# Patient Record
(demographics unavailable — no encounter records)

---

## 2024-10-11 NOTE — PHYSICAL EXAM
[General Appearance - Well Developed] : well developed [Normal Appearance] : normal appearance [General Appearance - In No Acute Distress] : no acute distress [] : no respiratory distress [Exaggerated Use Of Accessory Muscles For Inspiration] : no accessory muscle use [Urinary Bladder Findings] : the bladder was normal on palpation [Normal Station and Gait] : the gait and station were normal for the patient's age [No Focal Deficits] : no focal deficits [Oriented To Time, Place, And Person] : oriented to person, place, and time [Chaperone Present] : A chaperone was present in the examining room during all aspects of the physical examination [de-identified] : L superficial scrotal skin wound/breakdown - granulation tissue present. No erythema.  No expressible purulence. No obvious abscess, however L epididymis and testis firm. Mild TTP.  R testis wnl [FreeTextEntry2] : Aleja Waller

## 2024-10-11 NOTE — ASSESSMENT
[FreeTextEntry1] : Patient is a 38 yo M who presents for L epididymoorchitis.  Exam and sono today in office appear consistent with L testicular/epididymal necrosis.  D/w pt that suspect his skin wound is related to underlying epididymal/testicular necrosis D/w pt that at this time I recommend L orchiectomy as testis does not appear salvageable.  Pt declines. D/w pt that observation may result in worsening infection/sepsis, however ucx previously negative after his abx.  D/w pt that there is still normal R testis and likely to still have good preservation of fertility. Will obtain further labs to assess for any other etiologies for his necrosis - r/o TB, STI He remains hesitant to undergo orchiectomy.  He wishes to have additional abx, I d/w pt that oral abx given prior courses unlikely to be effective, he would likely need prolonged course of IV abx - he is planning to go to hospital in [place] F/u 1 wk otherwise

## 2024-10-11 NOTE — HISTORY OF PRESENT ILLNESS
[FreeTextEntry1] : Patient is a 38 yo M who presents for L epididymoorchitis.  Prior hx: First seen in 11/2022 - He reports past couple months dealing with intermittent penile rash and itch. No lesions. He notes that during these episodes when he has sex his penis will be painful. He did see PCP and try 2 topical ointments. Initial ointment improved after applying once daily for 2 wks, but then subsequently recurred and was given abx ointment. No discharge, no dysuria or urinary symptoms. He does have remote STI 10 years ago, cannot recall what exactly. He denies any recent STI. Recent labs from 8/2022 reviewed - no STIs. He is monogamous with his girlfriend, but unprotected.  Interval hx: 9/16/24 Patient reports left scrotum pain, swelling, radiated to low pelvis and left thigh. not associated with urinary frequency, urgency, dysuria, hematuria, fever/chills. treated with doxycycline 100 mg daily x 5 days. He was levaquin 500 mg x 10 days, symptom persisted, then Bactrim x 10 days. He reports no recurrent balanitis since last visit in 2023. He states he is not sexually active recently. He feels firm left scrotum, mild tenderness -- symptoms are significantly improved, also reports negative STI/urine testing with PCP. Reports scrotal sono showed just infection. Denies history of frequent UTI. History of syphilis? 10 years ago.  10/11/24 Scrotal ULT 8/16/24 showed L epididymitis Scrotal sono today shows worsening L epididymoorchitis - sonographic appearance concerning for epididymal/testicular necrosis. There is hyperemic flow and edematous/heterogenous.  No infarct as diffuse flow noted.  Labs 9/16/24 - UA/GC/cx negative  Here for f/u.  Reports persistent mild L scrotal pain.  No fever/chills. However he reports 1 wk ago noticing skin breakdown/ulceration.

## 2024-10-17 NOTE — ASSESSMENT
[FreeTextEntry1] : Patient is a 38 yo M who presents for persistent/evolving L epididymoorchitis.  He had previously failed ~3 wks of oral abx, with worsening and skin involvement. D/w pt that there is concern as to the etiology of this - but given +TB quant suspect ? TB  D/w pt that surgical excision is an option, but there may be a possibility of salvaging his testis with anti-TB medicaton Will refer to ID for further mgmt/eval Will obtain CXR - although pt denies pulm sxs OR order placed - however d/w pt that would prefer ID consult prior to any surgical intervention.  D/w pt that may cancel/postpone OR pending ID

## 2024-10-17 NOTE — PHYSICAL EXAM
[General Appearance - Well Developed] : well developed [Normal Appearance] : normal appearance [General Appearance - In No Acute Distress] : no acute distress [] : no respiratory distress [Exaggerated Use Of Accessory Muscles For Inspiration] : no accessory muscle use [Urinary Bladder Findings] : the bladder was normal on palpation [Normal Station and Gait] : the gait and station were normal for the patient's age [No Focal Deficits] : no focal deficits [Oriented To Time, Place, And Person] : oriented to person, place, and time [de-identified] : L superficial scrotal skin wound/breakdown - granulation tissue present. No erythema.  No expressible purulence, there is mild erythema and sponginess surrounding the ulceration. L epididymis and testis firm. Mild TTP.  R testis wnl

## 2024-10-17 NOTE — HISTORY OF PRESENT ILLNESS
[FreeTextEntry1] : Patient is a 38 yo M who presents for L epididymoorchitis.  Prior hx: First seen in 11/2022 - He reports past couple months dealing with intermittent penile rash and itch. No lesions. He notes that during these episodes when he has sex his penis will be painful. He did see PCP and try 2 topical ointments. Initial ointment improved after applying once daily for 2 wks, but then subsequently recurred and was given abx ointment. No discharge, no dysuria or urinary symptoms. He does have remote STI 10 years ago, cannot recall what exactly. He denies any recent STI. Recent labs from 8/2022 reviewed - no STIs. He is monogamous with his girlfriend, but unprotected.  Interval hx: 9/16/24 Patient reports left scrotum pain, swelling, radiated to low pelvis and left thigh. not associated with urinary frequency, urgency, dysuria, hematuria, fever/chills. treated with doxycycline 100 mg daily x 5 days. He was levaquin 500 mg x 10 days, symptom persisted, then Bactrim x 10 days. He reports no recurrent balanitis since last visit in 2023. He states he is not sexually active recently. He feels firm left scrotum, mild tenderness -- symptoms are significantly improved, also reports negative STI/urine testing with PCP. Reports scrotal sono showed just infection. Denies history of frequent UTI. History of syphilis? 10 years ago.  10/11/24 Scrotal ULT 8/16/24 showed L epididymitis Scrotal sono in office 10/11/24 shows worsening L epididymoorchitis - sonographic appearance concerning for epididymal/testicular necrosis. There is hyperemic flow and edematous/heterogenous.  No infarct as diffuse flow noted. Started to notice ulceration/skin breakdown 1 week ago.  10/17/24 Here for f/u.  Reports persistent mild L scrotal pain - stable.  No fever/chills.  However he reports since noticingskin breakdown/ulceration he has had some minimal drainage.  Labs 9/16/24 - UA/GC/cx negative Labs 10/11/24 - repeat UA/cx neg. STI panel neg. TB Quant Gold positive. Tumor markers neg.

## 2024-10-18 NOTE — PHYSICAL EXAM
[General Appearance - Well Developed] : well developed [Normal Appearance] : normal appearance [General Appearance - In No Acute Distress] : no acute distress [] : no respiratory distress [Exaggerated Use Of Accessory Muscles For Inspiration] : no accessory muscle use [Abdomen Soft] : soft [Normal Station and Gait] : the gait and station were normal for the patient's age [No Focal Deficits] : no focal deficits [Oriented To Time, Place, And Person] : oriented to person, place, and time

## 2024-10-21 NOTE — HISTORY OF PRESENT ILLNESS
[FreeTextEntry1] : Patient is a 36 yo M who presents for L epididymoorchitis.  Prior hx: First seen in 11/2022 - He reports past couple months dealing with intermittent penile rash and itch. No lesions. He notes that during these episodes when he has sex his penis will be painful. He did see PCP and try 2 topical ointments. Initial ointment improved after applying once daily for 2 wks, but then subsequently recurred and was given abx ointment. No discharge, no dysuria or urinary symptoms. He does have remote STI 10 years ago, cannot recall what exactly. He denies any recent STI. Recent labs from 8/2022 reviewed - no STIs. He is monogamous with his girlfriend, but unprotected.  Interval hx: 9/16/24 Patient presents for f/u, not seen since 1/2023.  He reports left scrotum pain/swelling for ~month, radiation to low pelvis and left thigh. Not associated with urinary frequency, urgency, dysuria, hematuria, fever/chills. Initially treated with doxycycline 100 mg daily x 5 days but no change. He was then given levaquin 500 mg x 10 days, symptom persisted, then Bactrim x 10 days. He reports no recurrent balanitis since last visit in 2023. He states he is not sexually active recently. He feels firm left scrotum, mild tenderness -- pain/symptoms are significantly improved, also reports negative STI/urine testing with PCP (labs 8/15/24 reviewed). Reports scrotal sono showed just infection. Denies history of frequent UTI. History of syphilis? 10 years ago.  10/11/24 Scrotal ULT from PCP obtained 8/16/24 showed L epididymitis Scrotal sono in office 10/11/24 shows worsening L epididymoorchitis - sonographic appearance concerning for epididymal/testicular necrosis. There is hyperemic flow and edematous/heterogenous. No infarct as diffuse flow noted. Started to notice ulceration/skin breakdown 1 week ago.  10/17/24 Here for f/u. Reports persistent mild L scrotal pain - stable. No fever/chills. However he reports since noticing skin breakdown/ulceration he has had some minimal drainage.  Labs 9/16/24 - UA/GC/cx negative Labs 10/11/24 - repeat UA/cx neg. STI panel neg. TB Quant Gold positive. Tumor markers neg.  10/18/24 Scrotal sonogram yesterday noted, consistent with left epididymal orchitis. PST and chest xray has been done this morning. He has contacted ID and awaiting for appt. CXR 10/18/24 reviewed personally - appears clear without obvious pathology.

## 2024-10-21 NOTE — HISTORY OF PRESENT ILLNESS
[FreeTextEntry1] : Patient is a 38 yo M who presents for L epididymoorchitis.  Prior hx: First seen in 11/2022 - He reports past couple months dealing with intermittent penile rash and itch. No lesions. He notes that during these episodes when he has sex his penis will be painful. He did see PCP and try 2 topical ointments. Initial ointment improved after applying once daily for 2 wks, but then subsequently recurred and was given abx ointment. No discharge, no dysuria or urinary symptoms. He does have remote STI 10 years ago, cannot recall what exactly. He denies any recent STI. Recent labs from 8/2022 reviewed - no STIs. He is monogamous with his girlfriend, but unprotected.  Interval hx: 9/16/24 Patient presents for f/u, not seen since 1/2023.  He reports left scrotum pain/swelling for ~month, radiation to low pelvis and left thigh. Not associated with urinary frequency, urgency, dysuria, hematuria, fever/chills. Initially treated with doxycycline 100 mg daily x 5 days but no change. He was then given levaquin 500 mg x 10 days, symptom persisted, then Bactrim x 10 days. He reports no recurrent balanitis since last visit in 2023. He states he is not sexually active recently. He feels firm left scrotum, mild tenderness -- pain/symptoms are significantly improved, also reports negative STI/urine testing with PCP (labs 8/15/24 reviewed). Reports scrotal sono showed just infection. Denies history of frequent UTI. History of syphilis? 10 years ago.  10/11/24 Scrotal ULT from PCP obtained 8/16/24 showed L epididymitis Scrotal sono in office 10/11/24 shows worsening L epididymoorchitis - sonographic appearance concerning for epididymal/testicular necrosis. There is hyperemic flow and edematous/heterogenous. No infarct as diffuse flow noted. Started to notice ulceration/skin breakdown 1 week ago.  10/17/24 Here for f/u. Reports persistent mild L scrotal pain - stable. No fever/chills. However he reports since noticing skin breakdown/ulceration he has had some minimal drainage.  Labs 9/16/24 - UA/GC/cx negative Labs 10/11/24 - repeat UA/cx neg. STI panel neg. TB Quant Gold positive. Tumor markers neg.  10/18/24 Scrotal sonogram yesterday noted, consistent with left epididymal orchitis. PST and chest xray has been done this morning. He has contacted ID and awaiting for appt. CXR 10/18/24 reviewed personally - appears clear without obvious pathology.

## 2024-10-21 NOTE — ASSESSMENT
[FreeTextEntry1] : Patient is a 38 yo M who presents for persistent/evolving L epididymoorchitis. Suspect  TB  He had previously failed ~3 wks of oral abx, with worsening and skin involvement. D/w pt that there is concern as to the etiology of this, as prior ucx and STI all neg.  Given +TB quant suspect ? TB as presentation is atypical  10/18/24 CXR without obvious pulm involvement 10/17/24 Scrotal sono reviewed - hypervascular and heterogeneous L testis and epididymis. D/w pt that surgical excision is an option, but there may be a possibility of salvaging his testis with anti-TB medication Message sent to ID for further mgmt/eval Will send urine AFB - d/w pt that may take many weeks D/w pt that I have reviewed his case with several senior partners, who feel that 1 month of anti-TB medication followed by reassessment may allow for salvage/preservation of his left testis D/w pt that if his condition worsens and progresses, could then perform orchiectomy  F/u 1- 2mos

## 2024-10-24 NOTE — PHYSICAL EXAM
[General Appearance - Alert] : alert [Sclera] : the sclera and conjunctiva were normal [Outer Ear] : the ears and nose were normal in appearance [Heart Rate And Rhythm] : heart rate was normal and rhythm regular [Edema] : there was no peripheral edema [Abdomen Soft] : soft [No Palpable Adenopathy] : no palpable adenopathy [Range of Motion to Joints] : range of motion to joints [] : no rash [Motor Exam] : the motor exam was normal [Oriented To Time, Place, And Person] : oriented to person, place, and time [FreeTextEntry1] : ulcer on left side of scrotum with purulent discharge, scrotum is tender and erythematous

## 2024-10-24 NOTE — HISTORY OF PRESENT ILLNESS
[FreeTextEntry1] : 37 year old male here with eft testicular pain x 2 months.  No dysuria, change in color of urine or change in odor. No change in urinary frequency or urgency.  He has had multiple cultures sent by other doctors. All are negative for bacteria.  He has been on 3 different antibiotic treatments (doxycycline, levaquin, bactrim). Pain got better but swelling remained after completing them.  He states that one week ago pus started coming out from the left side of the scrotum.   Ultrasound of the scrotum showed left epididymal orchitis.  Quantiferon positive.  Reports negative CXR one week ago.  Denies fevers, chills, night sweats, weight loss.  Has a dry cough x 1 week  Born in [place]. Moved to [place] in [place]. Has never been diagnosed or tested for TB before.

## 2024-11-04 NOTE — PHYSICAL EXAM
[General Appearance - Alert] : alert [Sclera] : the sclera and conjunctiva were normal [Outer Ear] : the ears and nose were normal in appearance [Edema] : there was no peripheral edema [Heart Rate And Rhythm] : heart rate was normal and rhythm regular [Abdomen Soft] : soft [FreeTextEntry1] : left scrotum with mild erythema, non-tender, + ulceration with purulent drainage  [No Palpable Adenopathy] : no palpable adenopathy [] : no rash [Motor Exam] : the motor exam was normal [Oriented To Time, Place, And Person] : oriented to person, place, and time

## 2024-11-04 NOTE — HISTORY OF PRESENT ILLNESS
For the rash may try   #1.  Over-the-counter children's Benadryl, 12.5 mg per 5 mill solution.  5 mils by mouth every 6 hours as needed for rash/itching.    #2 Also may try over-the-counter Cortaid /hydrocortisone cream twice a day as needed over 7 days.  
[FreeTextEntry1] : 37 year old male presented here with scrotal pain and abscess concerning for urinary TB.  Was seen here 10 days ago and started on RIPE + B6. He reports taking all med as prescibed without side effect.  He believes the scrotal pain is decreasing.  Reports that he still has pus but it is decreasing and wound is closing.

## 2024-11-20 NOTE — PHYSICAL EXAM
[General Appearance - Alert] : alert [Sclera] : the sclera and conjunctiva were normal [Outer Ear] : the ears and nose were normal in appearance [Heart Rate And Rhythm] : heart rate was normal and rhythm regular [Edema] : there was no peripheral edema [Abdomen Soft] : soft [No Palpable Adenopathy] : no palpable adenopathy [Musculoskeletal - Swelling] : no joint swelling [] : no rash [Deep Tendon Reflexes (DTR)] : deep tendon reflexes were 2+ and symmetric [Oriented To Time, Place, And Person] : oriented to person, place, and time [FreeTextEntry1] : left sided scrotal abscess with minimal discharge - smaller compared to last exam

## 2024-11-20 NOTE — HISTORY OF PRESENT ILLNESS
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 Was seen here on 10/24/24 and started on RIPE + B6. He reports taking all med as prescibed without side effect. He believes the scrotal pain is decreasing. Reports that he still has pus but it is decreasing and wound is closing.  Since then multiple urine AFB cultures have started to grow AFB as has a tissue culture

## 2024-11-25 NOTE — ASSESSMENT
[FreeTextEntry1] : Patient is a 36 yo M who presents for persistent/evolving L epididymoorchitis. Testing notable +  TB  10/18/24 CXR without obvious pulm involvement 10/17/24 Scrotal sono reviewed - hypervascular and heterogeneous L testis and epididymis.  He was evaluated by ID, started TB treatment with RIPE +B6. Clinically symptoms improved. He has had improved clinical exam/appearance. Will continue observation, d/w pt that at this time surgical intervention not indicated Multiple urine AFB cultures have started to grow AFB as has a tissue culture.  Will follow up with ID in 1-2 week for monitoring/repeat LFT.   F/u 3-4 mos.

## 2024-11-25 NOTE — HISTORY OF PRESENT ILLNESS
[FreeTextEntry1] : Patient is a 36 yo M who presents for L epididymoorchitis.  Prior hx: First seen in 11/2022 - He reports past couple months dealing with intermittent penile rash and itch. No lesions. He notes that during these episodes when he has sex his penis will be painful. He did see PCP and try 2 topical ointments. Initial ointment improved after applying once daily for 2 wks, but then subsequently recurred and was given abx ointment. No discharge, no dysuria or urinary symptoms. He does have remote STI 10 years ago, cannot recall what exactly. He denies any recent STI. Recent labs from 8/2022 reviewed - no STIs. He is monogamous with his girlfriend, but unprotected.  Interval hx: 9/16/24 Patient presents for f/u, not seen since 1/2023. He reports left scrotum pain/swelling for ~month, radiation to low pelvis and left thigh. Not associated with urinary frequency, urgency, dysuria, hematuria, fever/chills. Initially treated with doxycycline 100 mg daily x 5 days but no change. He was then given levaquin 500 mg x 10 days, symptom persisted, then Bactrim x 10 days. He reports no recurrent balanitis since last visit in 2023. He states he is not sexually active recently. He feels firm left scrotum, mild tenderness -- pain/symptoms are significantly improved, also reports negative STI/urine testing with PCP (labs 8/15/24 reviewed). Reports scrotal sono showed just infection. Denies history of frequent UTI. History of syphilis? 10 years ago.  10/11/24 Scrotal ULT from PCP obtained 8/16/24 showed L epididymitis Scrotal sono in office 10/11/24 shows worsening L epididymoorchitis - sonographic appearance concerning for epididymal/testicular necrosis. There is hyperemic flow and edematous/heterogenous. No infarct as diffuse flow noted. Started to notice ulceration/skin breakdown 1 week ago.  10/17/24 Here for f/u. Reports persistent mild L scrotal pain - stable. No fever/chills. However he reports since noticing skin breakdown/ulceration he has had some minimal drainage. Labs 9/16/24 - UA/GC/cx negative Labs 10/11/24 - repeat UA/cx neg. STI panel neg. TB Quant Gold positive. Tumor markers neg.  10/18/24 Scrotal sonogram yesterday noted, consistent with left epididymal orchitis. PST and chest xray has been done this morning. He has contacted ID and awaiting for appt. CXR 10/18/24 reviewed personally - appears clear without obvious pathology.  11/25/24 Patient was referred to ID and started treatment for genitourinary TB with RIPE + B6. He states has noticed significantly decreased swelling of left scrotum and resolution of mild pain.  Decreased induration and size of skin ulceration overall. Reports no more purulent discharge, currently with scant clear discharge. No scrotal pain or fever/chills. No hematuria or dysuria. Multiple urine AFB cultures have started to grow AFB as has a tissue culture.  Recent labs: 11/20/24 Wbc 7.81, Cr 1.01 10/24/24 AFB growth in urine

## 2024-11-25 NOTE — PHYSICAL EXAM
[General Appearance - Well Developed] : well developed [Normal Appearance] : normal appearance [General Appearance - In No Acute Distress] : no acute distress [] : no respiratory distress [Exaggerated Use Of Accessory Muscles For Inspiration] : no accessory muscle use [Normal Station and Gait] : the gait and station were normal for the patient's age [No Focal Deficits] : no focal deficits [Urinary Bladder Findings] : the bladder was normal on palpation [Oriented To Time, Place, And Person] : oriented to person, place, and time [de-identified] : L superficial scrotal skin wound/breakdown - much smaller in appearance with some granulation tissue present. No erythema.  No expressible purulence, there is mild erythema. L epididymis and testis firm - nontender and smaller in size than prior exam.  R testis wnl [Chaperone Present] : A chaperone was present in the examining room during all aspects of the physical examination [FreeTextEntry2] : Aleja Waller

## 2024-12-19 NOTE — HISTORY OF PRESENT ILLNESS
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 Was seen here on 10/24/24 and started on RIPE + B6. He reports taking all med as prescibed without side effect. He believes the scrotal pain is decreasing. Reports that there is no more drainage and wound is almost closed.

## 2024-12-19 NOTE — PHYSICAL EXAM
[General Appearance - Alert] : alert [Sclera] : the sclera and conjunctiva were normal [Outer Ear] : the ears and nose were normal in appearance [Neck Appearance] : the appearance of the neck was normal [Heart Rate And Rhythm] : heart rate was normal and rhythm regular [Edema] : there was no peripheral edema [Abdomen Soft] : soft [No Palpable Adenopathy] : no palpable adenopathy [Musculoskeletal - Swelling] : no joint swelling [] : no rash [Motor Exam] : the motor exam was normal [Oriented To Time, Place, And Person] : oriented to person, place, and time [FreeTextEntry1] : scrotum is not tender, no erythema, scrotal ulcer has almost completely resolved, no purulent drainage

## 2025-01-09 NOTE — PHYSICAL EXAM
[General Appearance - Alert] : alert [Sclera] : the sclera and conjunctiva were normal [Outer Ear] : the ears and nose were normal in appearance [Heart Rate And Rhythm] : heart rate was normal and rhythm regular [Edema] : there was no peripheral edema [Abdomen Soft] : soft [No Palpable Adenopathy] : no palpable adenopathy [Range of Motion to Joints] : range of motion to joints [] : no rash [Motor Exam] : the motor exam was normal [Oriented To Time, Place, And Person] : oriented to person, place, and time [FreeTextEntry1] : scrotum without any active drainage, non-tender, lesion has almost completed healed

## 2025-01-09 NOTE — HISTORY OF PRESENT ILLNESS
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 Was seen here on 10/24/24 and started on RIPE + B6.  He took RIPE until 2 weeks ago at which time sensitivities returned. He stopped Ethambutol and Pyrazinamide as instructed.  He continues to take INH, rifampin and Vit B6.  He has no side effects including abdominal pain.  He believes the scrotal pain is decreasing. Reports that there is no more drainage and wound is almost closed.

## 2025-01-28 NOTE — HISTORY OF PRESENT ILLNESS
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 Was seen here on 10/24/24 and started on RIPE + B6. He took RIPE until early January 2025 at which time sensitivities returned. He stopped Ethambutol and Pyrazinamide as instructed. He continues to take INH, rifampin and Vit B6. He has no side effects including abdominal pain. The scrotal pain has resolved. Reports that there is no more drainage and wound is closed.

## 2025-01-28 NOTE — PHYSICAL EXAM
[General Appearance - Alert] : alert [Sclera] : the sclera and conjunctiva were normal [Outer Ear] : the ears and nose were normal in appearance [] : no respiratory distress [Heart Rate And Rhythm] : heart rate was normal and rhythm regular [Edema] : there was no peripheral edema [Abdomen Soft] : soft [No Palpable Adenopathy] : no palpable adenopathy [Musculoskeletal - Swelling] : no joint swelling [Skin Color & Pigmentation] : normal skin color and pigmentation [Motor Exam] : the motor exam was normal [Oriented To Time, Place, And Person] : oriented to person, place, and time [FreeTextEntry1] : No drainage from scrotum; ulceration has healed; non-tender, no erythema

## 2025-02-24 NOTE — ASSESSMENT
[FreeTextEntry1] : Patient is a 38 yo M who presents for f/u of  TB.  Healing well on RIPE He will complete treatment in 2 months per ID D/w pt that after completing treatment he can likely resume usual activities without restriction F/u 6-12 mos

## 2025-02-24 NOTE — PHYSICAL EXAM
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  Scrotal ulceration - healed no open wound. no discharge/drainage [Chaperone Present] : A chaperone was present in the examining room during all aspects of the physical examination [FreeTextEntry2] : Jagjit CASTREJON

## 2025-02-24 NOTE — HISTORY OF PRESENT ILLNESS
[FreeTextEntry1] : Patient is a 36 yo M who presents for L epididymoorchitis/ TB.  Prior hx: First seen in 11/2022 - He reports past couple months dealing with intermittent penile rash and itch. No lesions. He notes that during these episodes when he has sex his penis will be painful. He did see PCP and try 2 topical ointments. Initial ointment improved after applying once daily for 2 wks, but then subsequently recurred and was given abx ointment. No discharge, no dysuria or urinary symptoms. He does have remote STI 10 years ago, cannot recall what exactly. He denies any recent STI. Recent labs from 8/2022 reviewed - no STIs. He is monogamous with his girlfriend, but unprotected.  Interval hx: 9/16/24 Patient presents for f/u, not seen since 1/2023. He reports left scrotum pain/swelling for ~month, radiation to low pelvis and left thigh. Not associated with urinary frequency, urgency, dysuria, hematuria, fever/chills. Initially treated with doxycycline 100 mg daily x 5 days but no change. He was then given levaquin 500 mg x 10 days, symptom persisted, then Bactrim x 10 days. He reports no recurrent balanitis since last visit in 2023. He states he is not sexually active recently. He feels firm left scrotum, mild tenderness -- pain/symptoms are significantly improved, also reports negative STI/urine testing with PCP (labs 8/15/24 reviewed). Reports scrotal sono showed just infection. Denies history of frequent UTI. History of syphilis? 10 years ago.  10/11/24 Scrotal ULT from PCP obtained 8/16/24 showed L epididymitis Scrotal sono in office 10/11/24 shows worsening L epididymoorchitis - sonographic appearance concerning for epididymal/testicular necrosis. There is hyperemic flow and edematous/heterogenous. No infarct as diffuse flow noted. Started to notice ulceration/skin breakdown 1 week ago.  10/17/24 Here for f/u. Reports persistent mild L scrotal pain - stable. No fever/chills. However he reports since noticing skin breakdown/ulceration he has had some minimal drainage. Labs 9/16/24 - UA/GC/cx negative Labs 10/11/24 - repeat UA/cx neg. STI panel neg. TB Quant Gold positive. Tumor markers neg.  10/18/24 Scrotal sonogram yesterday noted, consistent with left epididymal orchitis. PST and chest xray has been done this morning. He has contacted ID and awaiting for appt. CXR 10/18/24 reviewed personally - appears clear without obvious pathology.  11/25/24 Patient was referred to ID and started treatment for genitourinary TB with RIPE + B6. He states has noticed significantly decreased swelling of left scrotum and resolution of mild pain.  Decreased induration and size of skin ulceration overall. Reports no more purulent discharge, currently with scant clear discharge. No scrotal pain or fever/chills. No hematuria or dysuria. Multiple urine AFB cultures have started to grow AFB as has a tissue culture.  Recent labs: 11/20/24 Wbc 7.81, Cr 1.01 10/24/24 AFB growth in urine  2/24/25 He has completed 2 drugs of RIPE and remains on additional INH, Rifampin treatment until April per ID for 6 month course. He has no further discharge or scrotal pain. Scrotal ulcer has healed - skin has healed over with some scarring. Labs from ID - 1/28/25 CBC and BMP wnl

## 2025-02-25 NOTE — PHYSICAL EXAM
[General Appearance - Alert] : alert [Sclera] : the sclera and conjunctiva were normal [Outer Ear] : the ears and nose were normal in appearance [Heart Sounds Gallop] : no gallops [Heart Rate And Rhythm] : heart rate was normal and rhythm regular [Edema] : there was no peripheral edema [Abdomen Soft] : soft [No Palpable Adenopathy] : no palpable adenopathy [Musculoskeletal - Swelling] : no joint swelling [] : no rash [Motor Exam] : the motor exam was normal [Oriented To Time, Place, And Person] : oriented to person, place, and time [FreeTextEntry1] : No scrotal lesions, non-tender, no erythema, no drainage

## 2025-02-25 NOTE — HISTORY OF PRESENT ILLNESS
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 Was seen here on 10/24/24 and started on RIPE + B6. He took RIPE until early January 2025 at which time sensitivities returned. He stopped Ethambutol and Pyrazinamide as instructed. He continues to take INH, rifampin and Vit B6. He has no side effects including abdominal pain. The scrotal pain has resolved. Reports that there is no more drainage and wound is closed.  Reports compliance with meds.

## 2025-03-25 NOTE — PHYSICAL EXAM
[General Appearance - Alert] : alert [Sclera] : the sclera and conjunctiva were normal [Outer Ear] : the ears and nose were normal in appearance [Heart Rate And Rhythm] : heart rate was normal and rhythm regular [Edema] : there was no peripheral edema [Abdomen Soft] : soft [FreeTextEntry1] : no open lesions, erythema, tenderness, discharge [No Palpable Adenopathy] : no palpable adenopathy [Musculoskeletal - Swelling] : no joint swelling [] : no rash [Motor Exam] : the motor exam was normal [Oriented To Time, Place, And Person] : oriented to person, place, and time

## 2025-04-22 NOTE — PHYSICAL EXAM
[General Appearance - Alert] : alert [Sclera] : the sclera and conjunctiva were normal [Outer Ear] : the ears and nose were normal in appearance [Heart Rate And Rhythm] : heart rate was normal and rhythm regular [Edema] : there was no peripheral edema [Abdomen Soft] : soft [Penis Abnormality] : the penis was normal [Scrotum] : the scrotum was normal [Testes Swelling] : the testicles were not swollen [No Palpable Adenopathy] : no palpable adenopathy [Musculoskeletal - Swelling] : no joint swelling [] : no rash [Motor Exam] : the motor exam was normal [Oriented To Time, Place, And Person] : oriented to person, place, and time